# Patient Record
Sex: MALE | Race: WHITE | NOT HISPANIC OR LATINO | Employment: FULL TIME | URBAN - METROPOLITAN AREA
[De-identification: names, ages, dates, MRNs, and addresses within clinical notes are randomized per-mention and may not be internally consistent; named-entity substitution may affect disease eponyms.]

---

## 2023-03-21 ENCOUNTER — OFFICE VISIT (OUTPATIENT)
Dept: ORTHOPEDICS | Facility: CLINIC | Age: 53
End: 2023-03-21
Payer: OTHER MISCELLANEOUS

## 2023-03-21 VITALS
BODY MASS INDEX: 30.04 KG/M2 | HEIGHT: 78 IN | WEIGHT: 259.63 LBS | SYSTOLIC BLOOD PRESSURE: 144 MMHG | DIASTOLIC BLOOD PRESSURE: 91 MMHG | HEART RATE: 67 BPM

## 2023-03-21 DIAGNOSIS — M75.21 BICEPS TENDONITIS, RIGHT: Primary | ICD-10-CM

## 2023-03-21 PROCEDURE — 99204 PR OFFICE/OUTPT VISIT, NEW, LEVL IV, 45-59 MIN: ICD-10-PCS | Mod: ,,, | Performed by: SPECIALIST

## 2023-03-21 PROCEDURE — 99204 OFFICE O/P NEW MOD 45 MIN: CPT | Mod: ,,, | Performed by: SPECIALIST

## 2023-03-21 RX ORDER — INSULIN LISPRO 100 [IU]/ML
INJECTION, SOLUTION INTRAVENOUS; SUBCUTANEOUS
COMMUNITY

## 2023-03-21 RX ORDER — TACROLIMUS 0.5 MG/1
1 CAPSULE ORAL EVERY 12 HOURS
COMMUNITY
Start: 2018-05-28 | End: 2024-03-16

## 2023-03-21 RX ORDER — ACETAMINOPHEN 500 MG
500-1000 TABLET ORAL DAILY PRN
COMMUNITY

## 2023-03-21 RX ORDER — MYCOPHENOLATE MOFETIL 250 MG/1
CAPSULE ORAL
COMMUNITY
Start: 2018-05-28

## 2023-03-21 RX ORDER — INSULIN GLARGINE 100 [IU]/ML
INJECTION, SOLUTION SUBCUTANEOUS
COMMUNITY

## 2023-03-21 NOTE — LETTER
[facility]  [address] [place] [address]. [address], [address]  Phone: [phone]  Fax: [phone]    Name:Magdiel Martines  :1970   Date:2023       PATIENT IS ABLE TO RETURN TO WORK AS OF: 3/21/23    [_] SEDENTARY WORK: Lifting 10 pounds maximum and occasionally lifting and/or carrying articles such as dockers, ledgers and small tools.  Although a sedentary job is defined as one which involved sitting, a certain amount of walking and standing are required only occasionally and other sedentary criteria are met.    [_] LIGHT WORK: Lifting 20 pounds with frequent lifting and/or carrying objects weighing up to 10 pounds.  Even though the weight lifted may be only a negotiable amount, a job is in the category when it involves sitting most of the time with a degree of pushing/pulling of arm and/or leg controls.    [_] MEDIUM WORK: Lifting of 50 pounds maximum with frequent lifting and/or carrying of objects up to 25 pounds.    [_] HEAVY WORK: Lifting of 100 pounds maximum with frequent lifting and/or carrying objects up to 50 pounds.    [_] VERY HEAVY WORK: Lifting objects in excess of 100 pounds with frequent lifting and/or carrying of objects weighing 50 pounds or more.    [XX] REGULAR DUTY: [_] No Restrictions. 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       Juan Jose Lomax MD

## 2023-03-21 NOTE — PROGRESS NOTES
Chief Complaint:   Chief Complaint   Patient presents with    Right Elbow - Injury    Injury     pt having Rt elbow pain, DOI 2/17/23 pt is here for a second opinion, MRI was done in [place],  claim, pt states currently not having pain, not taking pain meds, pt is an RV tech, pt injured arm lifting to heavy states arm popped and had swelling, pt was advised by Dr. nagy to continue working but doing light duty work, [facility] seen by NP in [place]         History of present illness:    This is a 52 y.o. year old male who complains of right elbow pain after having a lifting incident at work.  He states that on 02/17/2023 he was moving a metal box which was quite heavy and he felt something crack or pop in his elbow he describes it as being like bubble wrap.  Since that time he is had improvement in his symptoms but he has had an MRI which shows an abnormality in his biceps tendon.  He normally works as RV  and does not do rest about work.      Past Medical History:   Diagnosis Date    Diabetes mellitus type I        Past Surgical History:   Procedure Laterality Date    APPENDECTOMY  2010    HERNIA REPAIR  2014    LIVER TRANSPLANT  2018       Current Outpatient Medications   Medication Instructions    acetaminophen (TYLENOL) 500-1,000 mg, Oral, Daily PRN    amLODIPine 1 mg/mL Soln No dose, route, or frequency recorded.    insulin glargine (LANTUS) 100 unit/mL injection Subcutaneous    insulin lispro 100 unit/mL injection Subcutaneous    mycophenolate (CELLCEPT) 250 mg Cap Oral    tacrolimus (PROGRAF) 1 mg, Oral, Every 12 hours        Review of patient's allergies indicates:  No Known Allergies    History reviewed. No pertinent family history.        Review of Systems:    Constitution:   Denies chills, fever, and sweats.  HENT:   Denies headaches or blurry vision.  Cardiovascular:  Denies chest pain or irregular heart beat.  Respiratory:   Denies cough or shortness of  "breath.  Gastrointestinal:  Denies abdominal pain, nausea, or vomiting.  Musculoskeletal:   Denies muscle cramps.  Neurological:   Denies dizziness or focal weakness.  Psychiatric/Behavior: Normal mental status.  Hematology/Lymph:  Denies bleeding problem or easy bruising/bleeding.  Skin:    Denies rash or suspicious lesions.    Examination:    Vital Signs:    Vitals:    03/21/23 0921   BP: (!) 144/91   Pulse: 67   Weight: 117.8 kg (259 lb 9.6 oz)   Height: 6' 7" (2.007 m)       Body mass index is 29.25 kg/m².    Constitution:   Well-developed, well nourished patient in no acute distress.  Neurological:   Alert and oriented x 3 and cooperative to examination.     Psychiatric/Behavior: Normal mental status.  Respiratory:   No shortness of breath.  Eyes:    Extraoccular muscles intact  Skin:    No scars, rash or suspicious lesions.    Musculoskeletal Exam :   This patient examination does not have any cervical tenderness he is full flexion and rotate rotation of his neck his shoulder has full forward elevation external rotation without pain he is no tenderness over the clavicle acromion.  The patient's elbow is nontender with palpation over the epicondyles or posteriorly over the olecranon the triceps tendon he does have some mild tenderness when I push on the biceps tendon but is tenderness palpable and has good biceps strength against resistance.    The patient has normal wrist range of motion dorsiflexion volar flexion he has good  strength he is normal sensation across his hands he has good radial pulse.      I reviewed the MRI of this patient's right elbow and he does have what appears to be some mild edema around the biceps tendon but his tendon is intact.     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      DISCLAIMER: This note may have been dictated using voice recognition software and may contain grammatical errors.     NOTE: " Consult report sent to referring provider via [employer] EMR.